# Patient Record
Sex: FEMALE | Race: WHITE | NOT HISPANIC OR LATINO | Employment: FULL TIME | URBAN - METROPOLITAN AREA
[De-identification: names, ages, dates, MRNs, and addresses within clinical notes are randomized per-mention and may not be internally consistent; named-entity substitution may affect disease eponyms.]

---

## 2025-01-16 ENCOUNTER — OFFICE VISIT (OUTPATIENT)
Dept: PRIMARY CARE | Facility: CLINIC | Age: 70
End: 2025-01-16
Payer: MEDICARE

## 2025-01-16 ENCOUNTER — TELEPHONE (OUTPATIENT)
Dept: PRIMARY CARE | Facility: CLINIC | Age: 70
End: 2025-01-16

## 2025-01-16 VITALS
HEIGHT: 66 IN | DIASTOLIC BLOOD PRESSURE: 84 MMHG | TEMPERATURE: 98.4 F | BODY MASS INDEX: 25.88 KG/M2 | HEART RATE: 110 BPM | WEIGHT: 161 LBS | SYSTOLIC BLOOD PRESSURE: 136 MMHG

## 2025-01-16 DIAGNOSIS — M79.604 RIGHT LEG PAIN: Primary | ICD-10-CM

## 2025-01-16 DIAGNOSIS — I82.431 ACUTE DEEP VEIN THROMBOSIS (DVT) OF POPLITEAL VEIN OF RIGHT LOWER EXTREMITY (MULTI): ICD-10-CM

## 2025-01-16 PROCEDURE — 99213 OFFICE O/P EST LOW 20 MIN: CPT | Performed by: FAMILY MEDICINE

## 2025-01-16 PROCEDURE — 1159F MED LIST DOCD IN RCRD: CPT | Performed by: FAMILY MEDICINE

## 2025-01-16 PROCEDURE — 1160F RVW MEDS BY RX/DR IN RCRD: CPT | Performed by: FAMILY MEDICINE

## 2025-01-16 PROCEDURE — 1036F TOBACCO NON-USER: CPT | Performed by: FAMILY MEDICINE

## 2025-01-16 PROCEDURE — 3008F BODY MASS INDEX DOCD: CPT | Performed by: FAMILY MEDICINE

## 2025-01-16 PROCEDURE — 1124F ACP DISCUSS-NO DSCNMKR DOCD: CPT | Performed by: FAMILY MEDICINE

## 2025-01-16 RX ORDER — APIXABAN 5 MG (74)
KIT ORAL
Qty: 74 TABLET | Refills: 0 | Status: CANCELLED | OUTPATIENT
Start: 2025-01-16

## 2025-01-16 ASSESSMENT — PATIENT HEALTH QUESTIONNAIRE - PHQ9
SUM OF ALL RESPONSES TO PHQ9 QUESTIONS 1 AND 2: 0
1. LITTLE INTEREST OR PLEASURE IN DOING THINGS: NOT AT ALL
2. FEELING DOWN, DEPRESSED OR HOPELESS: NOT AT ALL

## 2025-01-16 NOTE — PROGRESS NOTES
"Subjective   Patient ID: Emmie Krishna is a 69 y.o. female who presents for Leg Pain (Right leg pain ).    HPI   68 yo female presents co right lower leg pain since yesterday;  lower right medial calf;  she does have a varicose vein in this area with some swelling  She denies any redness or warmth.  No recent travel or prolonged immobilization.  Has not had any regular medications.  No hormones.  No trauma to the area.  Pain is worse with walking.  Does have a history of superficial blood clot years ago but no history of DVT or PE.  Denies any chest pain or shortness of breath.  Tried some heat and ibuprofen    Review of Systems  ROS as stated in HPI    Objective   /84   Pulse 110   Temp 36.9 °C (98.4 °F)   Ht 1.664 m (5' 5.5\")   Wt 73 kg (161 lb)   BMI 26.38 kg/m²     Physical Exam  Constitutional: A&O; NAD  Heart: RRR     Lungs: CTA bilateral   Ext: Right lower medial calf with area of tenderness over superficial varicosity.  No erythema or warmth.  Good pedal pulses.    Neuro: No gross neuro deficits     Psych: Judgment, orientation, mood, affect, insight wnl   Assessment/Plan   Problem List Items Addressed This Visit    None  Visit Diagnoses         Codes    Right leg pain    -  Primary M79.604    Relevant Orders    Vascular US lower extremity venous duplex right        Check stat duplex ultrasound to rule out DVT.  Fu with pcp for MWV     "

## 2025-01-16 NOTE — TELEPHONE ENCOUNTER
Patient sent for STAT duplex of right leg.  Showed phlebitis and acute free-floating DVT in her popliteal vein.  Patient sent to [facility].

## 2025-01-20 ENCOUNTER — APPOINTMENT (OUTPATIENT)
Dept: PRIMARY CARE | Facility: CLINIC | Age: 70
End: 2025-01-20
Payer: MEDICARE

## 2025-01-20 VITALS
SYSTOLIC BLOOD PRESSURE: 130 MMHG | BODY MASS INDEX: 25.55 KG/M2 | HEART RATE: 96 BPM | TEMPERATURE: 96.8 F | WEIGHT: 159 LBS | DIASTOLIC BLOOD PRESSURE: 80 MMHG | OXYGEN SATURATION: 97 % | HEIGHT: 66 IN

## 2025-01-20 DIAGNOSIS — I82.431 ACUTE DEEP VEIN THROMBOSIS (DVT) OF POPLITEAL VEIN OF RIGHT LOWER EXTREMITY (MULTI): Primary | ICD-10-CM

## 2025-01-20 DIAGNOSIS — Z12.11 COLON CANCER SCREENING: ICD-10-CM

## 2025-01-20 PROCEDURE — 1036F TOBACCO NON-USER: CPT | Performed by: FAMILY MEDICINE

## 2025-01-20 PROCEDURE — 1159F MED LIST DOCD IN RCRD: CPT | Performed by: FAMILY MEDICINE

## 2025-01-20 PROCEDURE — 99213 OFFICE O/P EST LOW 20 MIN: CPT | Performed by: FAMILY MEDICINE

## 2025-01-20 PROCEDURE — 1160F RVW MEDS BY RX/DR IN RCRD: CPT | Performed by: FAMILY MEDICINE

## 2025-01-20 PROCEDURE — 3008F BODY MASS INDEX DOCD: CPT | Performed by: FAMILY MEDICINE

## 2025-01-20 PROCEDURE — 1123F ACP DISCUSS/DSCN MKR DOCD: CPT | Performed by: FAMILY MEDICINE

## 2025-01-20 PROCEDURE — 1158F ADVNC CARE PLAN TLK DOCD: CPT | Performed by: FAMILY MEDICINE

## 2025-01-20 RX ORDER — APIXABAN 5 MG (74)
KIT ORAL
COMMUNITY
Start: 2025-01-16 | End: 2025-02-15

## 2025-01-20 ASSESSMENT — COLUMBIA-SUICIDE SEVERITY RATING SCALE - C-SSRS
2. HAVE YOU ACTUALLY HAD ANY THOUGHTS OF KILLING YOURSELF?: NO
1. IN THE PAST MONTH, HAVE YOU WISHED YOU WERE DEAD OR WISHED YOU COULD GO TO SLEEP AND NOT WAKE UP?: NO
6. HAVE YOU EVER DONE ANYTHING, STARTED TO DO ANYTHING, OR PREPARED TO DO ANYTHING TO END YOUR LIFE?: NO

## 2025-01-20 ASSESSMENT — PATIENT HEALTH QUESTIONNAIRE - PHQ9
1. LITTLE INTEREST OR PLEASURE IN DOING THINGS: NOT AT ALL
2. FEELING DOWN, DEPRESSED OR HOPELESS: NOT AT ALL
SUM OF ALL RESPONSES TO PHQ9 QUESTIONS 1 AND 2: 0

## 2025-01-20 NOTE — PROGRESS NOTES
"Subjective   Patient ID: Emmie Krishna is a 69 y.o. female who presents for Hospital Follow-up (Pt is here for hospital F/U Blood clot behind right knee ).  HPI    Review of Systems    Objective    /80   Pulse 96   Temp 36 °C (96.8 °F)   Ht 1.664 m (5' 5.5\")   Wt 72.1 kg (159 lb)   SpO2 97%   BMI 26.06 kg/m²    Physical Exam    Assessment/Plan   Problem List Items Addressed This Visit    None  Visit Diagnoses       Acute deep vein thrombosis (DVT) of popliteal vein of right lower extremity (Multi)    -  Primary    Colon cancer screening        Relevant Orders    Cologuard® colon cancer screening                 Na Arriaga MD  " distension, thyromegaly, or carotid bruits. Carotid upstrokes are brisk bilaterally. Lungs are clear to auscultation and percussion. Cardiac exam reveals the PMI to be normally sized and situated. Rhythm is regular. First and second heart sounds normal. No murmurs, rubs or gallops. Abdominal exam reveals normal bowel sounds, no masses, no organomegaly and no aortic enlargement. Extremities are nonedematous and both femoral and pedal pulses are normal.  Neurologic exam DTRs are equal bilaterally no focal deficits strength is symmetrical heme lymph no palpable lymph nodes in the neck axilla or groin    Assessment/Plan   Problem List Items Addressed This Visit       Acute deep vein thrombosis (DVT) of popliteal vein of right lower extremity (Multi) - Primary     Other Visit Diagnoses       Colon cancer screening        Relevant Orders    Cologuard® colon cancer screening                 Na Arriaga MD

## 2025-02-01 PROBLEM — I82.431 ACUTE DEEP VEIN THROMBOSIS (DVT) OF POPLITEAL VEIN OF RIGHT LOWER EXTREMITY (MULTI): Status: ACTIVE | Noted: 2025-02-01

## 2025-02-17 ENCOUNTER — TELEPHONE (OUTPATIENT)
Dept: PRIMARY CARE | Facility: CLINIC | Age: 70
End: 2025-02-17
Payer: MEDICARE

## 2025-02-17 NOTE — TELEPHONE ENCOUNTER
Pt was in [facility] on Weds night, stayed overnight because her heart rate went up to 197, they brought it down. She is on a beta blocker now. She has an appt with Dr Wasserman on Weds for a heart ablation. If needed she will schedule with Dr Arriaga for follow up after Weds appt. Please call pt [phone] if needed.

## 2025-02-26 ENCOUNTER — OFFICE VISIT (OUTPATIENT)
Dept: PRIMARY CARE | Facility: CLINIC | Age: 70
End: 2025-02-26
Payer: MEDICARE

## 2025-02-26 VITALS
TEMPERATURE: 96.8 F | SYSTOLIC BLOOD PRESSURE: 135 MMHG | HEIGHT: 66 IN | BODY MASS INDEX: 25.68 KG/M2 | RESPIRATION RATE: 18 BRPM | DIASTOLIC BLOOD PRESSURE: 84 MMHG | OXYGEN SATURATION: 97 % | HEART RATE: 80 BPM | WEIGHT: 159.8 LBS

## 2025-02-26 DIAGNOSIS — R00.0 TACHYCARDIA: Primary | ICD-10-CM

## 2025-02-26 PROCEDURE — 1160F RVW MEDS BY RX/DR IN RCRD: CPT | Performed by: FAMILY MEDICINE

## 2025-02-26 PROCEDURE — 1036F TOBACCO NON-USER: CPT | Performed by: FAMILY MEDICINE

## 2025-02-26 PROCEDURE — 1158F ADVNC CARE PLAN TLK DOCD: CPT | Performed by: FAMILY MEDICINE

## 2025-02-26 PROCEDURE — 1123F ACP DISCUSS/DSCN MKR DOCD: CPT | Performed by: FAMILY MEDICINE

## 2025-02-26 PROCEDURE — 1159F MED LIST DOCD IN RCRD: CPT | Performed by: FAMILY MEDICINE

## 2025-02-26 PROCEDURE — 3008F BODY MASS INDEX DOCD: CPT | Performed by: FAMILY MEDICINE

## 2025-02-26 PROCEDURE — 99214 OFFICE O/P EST MOD 30 MIN: CPT | Performed by: FAMILY MEDICINE

## 2025-02-26 RX ORDER — METOPROLOL SUCCINATE 25 MG/1
25 TABLET, EXTENDED RELEASE ORAL DAILY
COMMUNITY

## 2025-02-26 ASSESSMENT — PATIENT HEALTH QUESTIONNAIRE - PHQ9
2. FEELING DOWN, DEPRESSED OR HOPELESS: NOT AT ALL
1. LITTLE INTEREST OR PLEASURE IN DOING THINGS: NOT AT ALL
SUM OF ALL RESPONSES TO PHQ9 QUESTIONS 1 AND 2: 0

## 2025-02-26 NOTE — PROGRESS NOTES
"Subjective   Patient ID: Emmie Krishna is a 70 y.o. female who presents for Follow-up (Hospitalized overnight [facility] 2/12/25 for SVT / Needs to discuss ablation procedure Has appointment 03/05/25 to see Cardiology / Physiologist to schedule procedure.).  HPI    Review of Systems  Constitutional: no chills, no fever and no night sweats.   Eyes: no blurred vision and no eyesight problems.   ENT: no hearing loss, no nasal congestion, no nasal discharge, no hoarseness and no sore throat.   Cardiovascular: no chest pain, no intermittent leg claudication, no lower extremity edema, no palpitations and no syncope.   Respiratory: no cough, no shortness of breath during exertion, no shortness of breath at rest and no wheezing.   Gastrointestinal: no abdominal pain, no blood in stools, no constipation, no diarrhea, no melena, no nausea, no rectal pain and no vomiting.   Genitourinary: no dysuria, no change in urinary frequency, no urinary hesitancy, no feelings of urinary urgency and no vaginal discharge.   Musculoskeletal: no arthralgias,  no back pain and no myalgias.   Integumentary: no new skin lesions and no rashes.   Neurological: no difficulty walking, no headache, no limb weakness, no numbness and no tingling.   Psychiatric: no anxiety, no depression, no anhedonia and no substance use disorders.   Endocrine: no recent weight gain and no recent weight loss.   Hematologic/Lymphatic: no tendency for easy bruising and no swollen glands .    Objective    /84 (BP Location: Right arm, Patient Position: Sitting)   Pulse 80   Temp 36 °C (96.8 °F) (Temporal)   Resp 18   Ht 1.664 m (5' 5.5\")   Wt 72.5 kg (159 lb 12.8 oz)   SpO2 97%   BMI 26.19 kg/m²    Physical Exam  The patient appeared well nourished and normally developed. Vital signs as documented. Head exam is unremarkable. No scleral icterus or corneal arcus noted.  Pupils are equal round reactive to light extraocular movements are intact no hemorrhages noted " on funduscopic exam mouth mucous membranes are moist no exudates ears canals clear TMs are gray pearly not injected nose no rhinorrhea or epistaxis Neck is without jugular venous distension, thyromegaly, or carotid bruits. Carotid upstrokes are brisk bilaterally. Lungs are clear to auscultation and percussion. Cardiac exam reveals the PMI to be normally sized and situated. Rhythm is regular. First and second heart sounds normal. No murmurs, rubs or gallops. Abdominal exam reveals normal bowel sounds, no masses, no organomegaly and no aortic enlargement. Extremities are nonedematous and both femoral and pedal pulses are normal.  Neurologic exam DTRs are equal bilaterally no focal deficits strength is symmetrical heme lymph no palpable lymph nodes in the neck axilla or groin    Assessment/Plan   Problem List Items Addressed This Visit       Tachycardia - Primary            Na Arriaga MD

## 2025-03-01 PROBLEM — R00.0 TACHYCARDIA: Status: ACTIVE | Noted: 2025-03-01

## 2025-03-04 ENCOUNTER — TELEPHONE (OUTPATIENT)
Dept: CARDIOLOGY | Facility: CLINIC | Age: 70
End: 2025-03-04
Payer: MEDICARE

## 2025-03-04 NOTE — TELEPHONE ENCOUNTER
Patient is scheduled to see Dr. Ramicone on 3/5/25.  Patient is requesting a letter stating that she will be unable to fly due to an ablation.  States that she is scheduled to fly to [place] on 3/10/25.

## 2025-03-05 ENCOUNTER — OFFICE VISIT (OUTPATIENT)
Dept: CARDIOLOGY | Facility: CLINIC | Age: 70
End: 2025-03-05
Payer: MEDICARE

## 2025-03-05 VITALS
DIASTOLIC BLOOD PRESSURE: 83 MMHG | WEIGHT: 159 LBS | SYSTOLIC BLOOD PRESSURE: 144 MMHG | HEART RATE: 79 BPM | OXYGEN SATURATION: 98 % | BODY MASS INDEX: 26.49 KG/M2 | HEIGHT: 65 IN

## 2025-03-05 DIAGNOSIS — I47.10 PSVT (PAROXYSMAL SUPRAVENTRICULAR TACHYCARDIA) (CMS-HCC): Primary | ICD-10-CM

## 2025-03-05 DIAGNOSIS — I82.431 ACUTE DEEP VEIN THROMBOSIS (DVT) OF POPLITEAL VEIN OF RIGHT LOWER EXTREMITY (MULTI): ICD-10-CM

## 2025-03-05 PROBLEM — R55 SYNCOPE: Status: ACTIVE | Noted: 2025-02-12

## 2025-03-05 PROBLEM — I82.409 DEEP VEIN THROMBOSIS (DVT) OF LOWER EXTREMITY (MULTI): Status: ACTIVE | Noted: 2025-01-16

## 2025-03-05 PROBLEM — I83.219: Status: ACTIVE | Noted: 2023-01-27

## 2025-03-05 PROBLEM — R42 LIGHTHEADEDNESS: Status: ACTIVE | Noted: 2025-02-06

## 2025-03-05 PROBLEM — L97.929: Status: ACTIVE | Noted: 2023-01-27

## 2025-03-05 PROBLEM — I83.229: Status: ACTIVE | Noted: 2023-01-27

## 2025-03-05 PROBLEM — L97.919: Status: ACTIVE | Noted: 2023-01-27

## 2025-03-05 PROCEDURE — 99215 OFFICE O/P EST HI 40 MIN: CPT | Performed by: INTERNAL MEDICINE

## 2025-03-05 PROCEDURE — 99205 OFFICE O/P NEW HI 60 MIN: CPT | Performed by: INTERNAL MEDICINE

## 2025-03-05 PROCEDURE — 93005 ELECTROCARDIOGRAM TRACING: CPT | Performed by: INTERNAL MEDICINE

## 2025-03-05 PROCEDURE — 1159F MED LIST DOCD IN RCRD: CPT | Performed by: INTERNAL MEDICINE

## 2025-03-05 PROCEDURE — 1123F ACP DISCUSS/DSCN MKR DOCD: CPT | Performed by: INTERNAL MEDICINE

## 2025-03-05 PROCEDURE — 1036F TOBACCO NON-USER: CPT | Performed by: INTERNAL MEDICINE

## 2025-03-05 PROCEDURE — 3008F BODY MASS INDEX DOCD: CPT | Performed by: INTERNAL MEDICINE

## 2025-03-05 RX ORDER — METOPROLOL SUCCINATE 25 MG/1
25 TABLET, EXTENDED RELEASE ORAL DAILY
Qty: 30 TABLET | Refills: 11 | Status: SHIPPED | OUTPATIENT
Start: 2025-03-05 | End: 2026-03-05

## 2025-03-05 RX ORDER — APIXABAN 5 MG/1
1 TABLET, FILM COATED ORAL
COMMUNITY
Start: 2025-02-10

## 2025-03-05 NOTE — ASSESSMENT & PLAN NOTE
The patient is taking Eliquis 5 mg twice daily for a right popliteal DVT.  She is going to follow-up with Dr. Altaf Sutherland this month and then determine the duration of therapy needed for the DVT.  We will plan to temporarily interrupt the anticoagulation for the ablation procedure, and heparin will be utilized during the ablation to minimize risk of DVT formation.

## 2025-03-05 NOTE — PROGRESS NOTES
Reason For Consult:    Supraventricular tachycardia    History Of Present Illness:    This is a 70-year-old female with supraventricular tachycardia.  She presents today for an electrophysiology consultation.  The patient first began experiencing episodes of supraventricular tachycardia in the summer 2024.  1 episode occurred while she was playing pickle ball.  She sat down to take a rest and the arrhythmia stopped spontaneously.  She had a few other episodes that would occur randomly and would also terminate spontaneously.  More recently she had episodes on January 6, 2025 and then an episode on February 12, 2025 which required intravenous adenosine for termination.  The patient was seen at [facility].  She has been been taking metoprolol succinate 25 mg daily.    Past medical history:    Popliteal DVT of the right lower extremity: This occurred following a long plane flight to [place].  She has been on Eliquis 5 mg twice daily since January 16, 2025.    Past surgical history: Foot surgery    Social history: Non-smoker    Family history: Negative for premature CAD    Review of systems:  Other review of systems negative other than as outlined in HPI    Social History:  She reports that she has never smoked. She has never used smokeless tobacco. She reports that she does not currently use alcohol. She reports that she does not use drugs.    Family History:  No family history on file.    Allergies:  Prednisone and Thimerosal    Medications:  Current Outpatient Medications   Medication Instructions    Eliquis DVT-PE Treat 30D Start 5 mg (74 tabs) tablet 1 packets, ORAL, ONCE, as directed on package labeling;, 30 days, 1 EA, Date: 1/16/25 4:22:00 PM EST    metoprolol succinate XL (TOPROL-XL) 25 mg, oral, Daily, Do not crush or chew.       Vitals:      10/25/2022    11:54 AM 1/16/2025    12:50 PM 1/20/2025     1:07 PM 1/20/2025     1:22 PM 2/26/2025     1:02 PM   Vitals   Systolic 124 136 155  "130 135   Diastolic 68 84 80 80 84   BP Location     Right arm   Heart Rate 67 110 96  80   Temp 36.2 °C (97.2 °F) 36.9 °C (98.4 °F) 36 °C (96.8 °F)  36 °C (96.8 °F)   Resp     18   Height 1.664 m (5' 5.5\") 1.664 m (5' 5.5\") 1.664 m (5' 5.5\")  1.664 m (5' 5.5\")   Weight (lb) 158 161 159  159.8   BMI 25.89 kg/m2 26.38 kg/m2 26.06 kg/m2  26.19 kg/m2   BSA (m2) 1.82 m2 1.84 m2 1.83 m2  1.83 m2   Visit Report  Report Report Report Report       Physical Exam:    General Appearance:  Alert, oriented, no distress  Skin:  Warm and dry  Head and Neck:  No elevation of JVP, no carotid bruits  Cardiac Exam:  Rhythm is regular, S1 and S2 are normal, no murmur S3 or S4  Lungs:  Clear to auscultation  Extremities:  no edema  Neurologic:  No focal deficits  Psychiatric:  Appropriate mood and behavior    Lab Results:     CMP:No results for input(s): \"NA\", \"K\", \"CL\", \"CO2\", \"ANIONGAP\", \"BUN\", \"CREATININE\", \"EGFR\", \"MG\" in the last 04897 hours.No results for input(s): \"ALBUMIN\", \"ALKPHOS\", \"ALT\", \"AST\", \"BILITOT\", \"LIPASE\" in the last 88161 hours.    No lab exists for component: \"CA\"  CBC:No results for input(s): \"WBC\", \"HGB\", \"HCT\", \"PLT\", \"MCV\" in the last 79516 hours.  COAG: No results for input(s): \"PTT\", \"INR\", \"HAUF\", \"DDIMERVTE\", \"HAPTOGLOBIN\", \"FIBRINOGEN\" in the last 85650 hours.  HEME/ENDO:No results for input(s): \"FERRITIN\", \"IRONSAT\", \"TSH\", \"HGBA1C\" in the last 63171 hours.   CARDIAC: No results for input(s): \"LDH\", \"CKMB\", \"TROPHS\", \"BNP\" in the last 35930 hours.    No lab exists for component: \"CK\", \"CKMBP\"No results for input(s): \"CHOL\", \"LDLF\", \"HDL\", \"TRIG\" in the last 60430 hours.    Test Results (personally reviewed):    EKG March 5, 2025: Sinus rhythm, no ventricular preexcitation    SVT is a regular narrow QRS complex tachycardia, 175 bpm    Assessment/Plan  Problem List Items Addressed This Visit             ICD-10-CM    Acute deep vein thrombosis (DVT) of popliteal vein of right lower extremity (Multi) I82.431 "     The patient is taking Eliquis 5 mg twice daily for a right popliteal DVT.  She is going to follow-up with Dr. Altaf Sutherland this month and then determine the duration of therapy needed for the DVT.  We will plan to temporarily interrupt the anticoagulation for the ablation procedure, and heparin will be utilized during the ablation to minimize risk of DVT formation.         PSVT (paroxysmal supraventricular tachycardia) (CMS-[facility]) - Primary I47.10     Emmie has a history of SVT which began during the summer 2024.  Most of the episodes terminated spontaneously.  The arrhythmia would typically begin with an abrupt onset and then eventually terminate.  However, in February 2025 the arrhythmia did not terminate and she was given 6 mg of intravenous adenosine which restored sinus rhythm.  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  Catheter ablation is recommended.  The ablation procedure and risks were discussed in detail today with the patient and her family.  She is in agreement with the recommendation and this will be scheduled at [facility].         Relevant Medications    metoprolol succinate XL (Toprol-XL) 25 mg 24 hr tablet    Other Relevant Orders    ECG 12 lead (Clinic Performed)     James C Ramicone, DO

## 2025-03-05 NOTE — PATIENT INSTRUCTIONS
You will be scheduled for a catheter ablation at [facility].    Hold Metoprolol 48 hours before ablation.

## 2025-03-05 NOTE — H&P (VIEW-ONLY)
Reason For Consult:    Supraventricular tachycardia    History Of Present Illness:    This is a 70-year-old female with supraventricular tachycardia.  She presents today for an electrophysiology consultation.  The patient first began experiencing episodes of supraventricular tachycardia in the summer 2024.  1 episode occurred while she was playing pickle ball.  She sat down to take a rest and the arrhythmia stopped spontaneously.  She had a few other episodes that would occur randomly and would also terminate spontaneously.  More recently she had episodes on January 6, 2025 and then an episode on February 12, 2025 which required intravenous adenosine for termination.  The patient was seen at [facility].  She has been been taking metoprolol succinate 25 mg daily.    Past medical history:    Popliteal DVT of the right lower extremity: This occurred following a long plane flight to [place].  She has been on Eliquis 5 mg twice daily since January 16, 2025.    Past surgical history: Foot surgery    Social history: Non-smoker    Family history: Negative for premature CAD    Review of systems:  Other review of systems negative other than as outlined in HPI    Social History:  She reports that she has never smoked. She has never used smokeless tobacco. She reports that she does not currently use alcohol. She reports that she does not use drugs.    Family History:  No family history on file.    Allergies:  Prednisone and Thimerosal    Medications:  Current Outpatient Medications   Medication Instructions    Eliquis DVT-PE Treat 30D Start 5 mg (74 tabs) tablet 1 packets, ORAL, ONCE, as directed on package labeling;, 30 days, 1 EA, Date: 1/16/25 4:22:00 PM EST    metoprolol succinate XL (TOPROL-XL) 25 mg, oral, Daily, Do not crush or chew.       Vitals:      10/25/2022    11:54 AM 1/16/2025    12:50 PM 1/20/2025     1:07 PM 1/20/2025     1:22 PM 2/26/2025     1:02 PM   Vitals   Systolic 124 136 155  "130 135   Diastolic 68 84 80 80 84   BP Location     Right arm   Heart Rate 67 110 96  80   Temp 36.2 °C (97.2 °F) 36.9 °C (98.4 °F) 36 °C (96.8 °F)  36 °C (96.8 °F)   Resp     18   Height 1.664 m (5' 5.5\") 1.664 m (5' 5.5\") 1.664 m (5' 5.5\")  1.664 m (5' 5.5\")   Weight (lb) 158 161 159  159.8   BMI 25.89 kg/m2 26.38 kg/m2 26.06 kg/m2  26.19 kg/m2   BSA (m2) 1.82 m2 1.84 m2 1.83 m2  1.83 m2   Visit Report  Report Report Report Report       Physical Exam:    General Appearance:  Alert, oriented, no distress  Skin:  Warm and dry  Head and Neck:  No elevation of JVP, no carotid bruits  Cardiac Exam:  Rhythm is regular, S1 and S2 are normal, no murmur S3 or S4  Lungs:  Clear to auscultation  Extremities:  no edema  Neurologic:  No focal deficits  Psychiatric:  Appropriate mood and behavior    Lab Results:     CMP:No results for input(s): \"NA\", \"K\", \"CL\", \"CO2\", \"ANIONGAP\", \"BUN\", \"CREATININE\", \"EGFR\", \"MG\" in the last 21905 hours.No results for input(s): \"ALBUMIN\", \"ALKPHOS\", \"ALT\", \"AST\", \"BILITOT\", \"LIPASE\" in the last 53518 hours.    No lab exists for component: \"CA\"  CBC:No results for input(s): \"WBC\", \"HGB\", \"HCT\", \"PLT\", \"MCV\" in the last 17238 hours.  COAG: No results for input(s): \"PTT\", \"INR\", \"HAUF\", \"DDIMERVTE\", \"HAPTOGLOBIN\", \"FIBRINOGEN\" in the last 41616 hours.  HEME/ENDO:No results for input(s): \"FERRITIN\", \"IRONSAT\", \"TSH\", \"HGBA1C\" in the last 43349 hours.   CARDIAC: No results for input(s): \"LDH\", \"CKMB\", \"TROPHS\", \"BNP\" in the last 56670 hours.    No lab exists for component: \"CK\", \"CKMBP\"No results for input(s): \"CHOL\", \"LDLF\", \"HDL\", \"TRIG\" in the last 31356 hours.    Test Results (personally reviewed):    EKG March 5, 2025: Sinus rhythm, no ventricular preexcitation    SVT is a regular narrow QRS complex tachycardia, 175 bpm    Assessment/Plan  Problem List Items Addressed This Visit             ICD-10-CM    Acute deep vein thrombosis (DVT) of popliteal vein of right lower extremity (Multi) I82.431 "     The patient is taking Eliquis 5 mg twice daily for a right popliteal DVT.  She is going to follow-up with Dr. Altaf Sutherland this month and then determine the duration of therapy needed for the DVT.  We will plan to temporarily interrupt the anticoagulation for the ablation procedure, and heparin will be utilized during the ablation to minimize risk of DVT formation.         PSVT (paroxysmal supraventricular tachycardia) (CMS-[facility]) - Primary I47.10     Emmie has a history of SVT which began during the summer 2024.  Most of the episodes terminated spontaneously.  The arrhythmia would typically begin with an abrupt onset and then eventually terminate.  However, in February 2025 the arrhythmia did not terminate and she was given 6 mg of intravenous adenosine which restored sinus rhythm.  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  Catheter ablation is recommended.  The ablation procedure and risks were discussed in detail today with the patient and her family.  She is in agreement with the recommendation and this will be scheduled at [facility].         Relevant Medications    metoprolol succinate XL (Toprol-XL) 25 mg 24 hr tablet    Other Relevant Orders    ECG 12 lead (Clinic Performed)     James C Ramicone, DO

## 2025-03-05 NOTE — LETTER
March 5, 2025     Na Arriaga MD  [address] [address]  [address]  [place] [address]    Patient: Emmie Krishna   YOB: 1955   Date of Visit: 3/5/2025       Dear Na    I saw Emmie Krishna today for evaluation of SVT. Below are my notes for this consultation.  If you have questions, please do not hesitate to call me. I look forward to following your patient along with you.       Sincerely,     James C Ramicone, DO      CC: No Recipients  ______________________________________________________________________________________          Reason For Consult:    Supraventricular tachycardia    History Of Present Illness:    This is a 70-year-old female with supraventricular tachycardia.  She presents today for an electrophysiology consultation.  The patient first began experiencing episodes of supraventricular tachycardia in the summer 2024.  1 episode occurred while she was playing pickle ball.  She sat down to take a rest and the arrhythmia stopped spontaneously.  She had a few other episodes that would occur randomly and would also terminate spontaneously.  More recently she had episodes on January 6, 2025 and then an episode on February 12, 2025 which required intravenous adenosine for termination.  The patient was seen at [facility].  She has been been taking metoprolol succinate 25 mg daily.    Past medical history:    Popliteal DVT of the right lower extremity: This occurred following a long plane flight to [place].  She has been on Eliquis 5 mg twice daily since January 16, 2025.    Past surgical history: Foot surgery    Social history: Non-smoker    Family history: Negative for premature CAD    Review of systems:  Other review of systems negative other than as outlined in HPI    Social History:  She reports that she has never smoked. She has never used smokeless tobacco. She reports that she does not currently use alcohol. She reports that she does not use drugs.    Family  "History:  No family history on file.    Allergies:  Prednisone and Thimerosal    Medications:  Current Outpatient Medications   Medication Instructions   • Eliquis DVT-PE Treat 30D Start 5 mg (74 tabs) tablet 1 packets, ORAL, ONCE, as directed on package labeling;, 30 days, 1 EA, Date: 1/16/25 4:22:00 PM EST   • metoprolol succinate XL (TOPROL-XL) 25 mg, oral, Daily, Do not crush or chew.       Vitals:      10/25/2022    11:54 AM 1/16/2025    12:50 PM 1/20/2025     1:07 PM 1/20/2025     1:22 PM 2/26/2025     1:02 PM   Vitals   Systolic 124 136 155 130 135   Diastolic 68 84 80 80 84   BP Location     Right arm   Heart Rate 67 110 96  80   Temp 36.2 °C (97.2 °F) 36.9 °C (98.4 °F) 36 °C (96.8 °F)  36 °C (96.8 °F)   Resp     18   Height 1.664 m (5' 5.5\") 1.664 m (5' 5.5\") 1.664 m (5' 5.5\")  1.664 m (5' 5.5\")   Weight (lb) 158 161 159  159.8   BMI 25.89 kg/m2 26.38 kg/m2 26.06 kg/m2  26.19 kg/m2   BSA (m2) 1.82 m2 1.84 m2 1.83 m2  1.83 m2   Visit Report  Report Report Report Report       Physical Exam:    General Appearance:  Alert, oriented, no distress  Skin:  Warm and dry  Head and Neck:  No elevation of JVP, no carotid bruits  Cardiac Exam:  Rhythm is regular, S1 and S2 are normal, no murmur S3 or S4  Lungs:  Clear to auscultation  Extremities:  no edema  Neurologic:  No focal deficits  Psychiatric:  Appropriate mood and behavior    Lab Results:     CMP:No results for input(s): \"NA\", \"K\", \"CL\", \"CO2\", \"ANIONGAP\", \"BUN\", \"CREATININE\", \"EGFR\", \"MG\" in the last 96909 hours.No results for input(s): \"ALBUMIN\", \"ALKPHOS\", \"ALT\", \"AST\", \"BILITOT\", \"LIPASE\" in the last 94168 hours.    No lab exists for component: \"CA\"  CBC:No results for input(s): \"WBC\", \"HGB\", \"HCT\", \"PLT\", \"MCV\" in the last 34182 hours.  COAG: No results for input(s): \"PTT\", \"INR\", \"HAUF\", \"DDIMERVTE\", \"HAPTOGLOBIN\", \"FIBRINOGEN\" in the last 95218 hours.  HEME/ENDO:No results for input(s): \"FERRITIN\", \"IRONSAT\", \"TSH\", \"HGBA1C\" in the last 65849 hours. " "  CARDIAC: No results for input(s): \"LDH\", \"CKMB\", \"TROPHS\", \"BNP\" in the last 74889 hours.    No lab exists for component: \"CK\", \"CKMBP\"No results for input(s): \"CHOL\", \"LDLF\", \"HDL\", \"TRIG\" in the last 48427 hours.    Test Results (personally reviewed):    EKG March 5, 2025: Sinus rhythm, no ventricular preexcitation    SVT is a regular narrow QRS complex tachycardia, 175 bpm    Assessment/Plan  Problem List Items Addressed This Visit             ICD-10-CM    Acute deep vein thrombosis (DVT) of popliteal vein of right lower extremity (Multi) I82.431     The patient is taking Eliquis 5 mg twice daily for a right popliteal DVT.  She is going to follow-up with Dr. Altaf Sutherland this month and then determine the duration of therapy needed for the DVT.  We will plan to temporarily interrupt the anticoagulation for the ablation procedure, and heparin will be utilized during the ablation to minimize risk of DVT formation.         PSVT (paroxysmal supraventricular tachycardia) (CMS-[facility]) - Primary I47.10     Emmie has a history of SVT which began during the summer 2024.  Most of the episodes terminated spontaneously.  The arrhythmia would typically begin with an abrupt onset and then eventually terminate.  However, in February 2025 the arrhythmia did not terminate and she was given 6 mg of intravenous adenosine which restored sinus rhythm.  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  Catheter ablation is recommended.  The ablation procedure and risks were discussed in detail today with the patient and her family.  She is in agreement with the recommendation and this will be scheduled at [facility].         Relevant Medications    metoprolol succinate XL (Toprol-XL) 25 mg 24 hr tablet    Other Relevant Orders    ECG 12 lead (Clinic Performed)     James C Ramicone, DO    "

## 2025-03-05 NOTE — ASSESSMENT & PLAN NOTE
Emmie has a history of SVT which began during the summer 2024.  Most of the episodes terminated spontaneously.  The arrhythmia would typically begin with an abrupt onset and then eventually terminate.  However, in February 2025 the arrhythmia did not terminate and she was given 6 mg of intravenous adenosine which restored sinus rhythm.  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  Catheter ablation is recommended.  The ablation procedure and risks were discussed in detail today with the patient and her family.  She is in agreement with the recommendation and this will be scheduled at [facility].

## 2025-03-06 ENCOUNTER — TELEPHONE (OUTPATIENT)
Dept: PRIMARY CARE | Facility: CLINIC | Age: 70
End: 2025-03-06
Payer: MEDICARE

## 2025-03-06 LAB
ATRIAL RATE: 78 BPM
P AXIS: 61 DEGREES
P OFFSET: 189 MS
P ONSET: 148 MS
PR INTERVAL: 152 MS
Q ONSET: 224 MS
QRS COUNT: 13 BEATS
QRS DURATION: 70 MS
QT INTERVAL: 374 MS
QTC CALCULATION(BAZETT): 426 MS
QTC FREDERICIA: 408 MS
R AXIS: 78 DEGREES
T AXIS: 76 DEGREES
T OFFSET: 411 MS
VENTRICULAR RATE: 78 BPM

## 2025-03-06 NOTE — TELEPHONE ENCOUNTER
Pt is needing a letter written to the airport so she can get a refund on her flight stating that she cannot fly due to an upcoming Catheter ablation procedure

## 2025-03-07 ENCOUNTER — TELEPHONE (OUTPATIENT)
Dept: CARDIOLOGY | Facility: CLINIC | Age: 70
End: 2025-03-07
Payer: MEDICARE

## 2025-03-07 DIAGNOSIS — I47.10 PSVT (PAROXYSMAL SUPRAVENTRICULAR TACHYCARDIA) (CMS-HCC): ICD-10-CM

## 2025-03-07 NOTE — TELEPHONE ENCOUNTER
Called and left message for patient to call me back. I would like to schedule her ablation with Dr. Ramicone

## 2025-03-07 NOTE — TELEPHONE ENCOUNTER
Patient will take 3/25/25 Ablation, please call her back today to review this, and give her the time.  Will be nita. To talk today after 3:30pm

## 2025-03-24 LAB — NONINV COLON CA DNA+OCC BLD SCRN STL QL: NEGATIVE

## 2025-03-25 ENCOUNTER — ANESTHESIA (OUTPATIENT)
Dept: CARDIOLOGY | Facility: HOSPITAL | Age: 70
End: 2025-03-25
Payer: MEDICARE

## 2025-03-25 ENCOUNTER — HOSPITAL ENCOUNTER (OUTPATIENT)
Dept: CARDIOLOGY | Facility: HOSPITAL | Age: 70
Discharge: HOME | End: 2025-03-25
Payer: MEDICARE

## 2025-03-25 ENCOUNTER — HOSPITAL ENCOUNTER (OUTPATIENT)
Facility: HOSPITAL | Age: 70
Setting detail: OUTPATIENT SURGERY
Discharge: HOME | End: 2025-03-25
Attending: INTERNAL MEDICINE | Admitting: INTERNAL MEDICINE
Payer: MEDICARE

## 2025-03-25 ENCOUNTER — TELEPHONE (OUTPATIENT)
Dept: PRIMARY CARE | Facility: CLINIC | Age: 70
End: 2025-03-25

## 2025-03-25 ENCOUNTER — ANESTHESIA EVENT (OUTPATIENT)
Dept: CARDIOLOGY | Facility: HOSPITAL | Age: 70
End: 2025-03-25
Payer: MEDICARE

## 2025-03-25 VITALS
SYSTOLIC BLOOD PRESSURE: 127 MMHG | RESPIRATION RATE: 15 BRPM | TEMPERATURE: 97 F | WEIGHT: 159.17 LBS | HEIGHT: 65 IN | BODY MASS INDEX: 26.52 KG/M2 | HEART RATE: 80 BPM | OXYGEN SATURATION: 99 % | DIASTOLIC BLOOD PRESSURE: 66 MMHG

## 2025-03-25 DIAGNOSIS — I47.10 PSVT (PAROXYSMAL SUPRAVENTRICULAR TACHYCARDIA) (CMS-HCC): ICD-10-CM

## 2025-03-25 LAB
ABO GROUP (TYPE) IN BLOOD: NORMAL
ABO GROUP (TYPE) IN BLOOD: NORMAL
ANION GAP SERPL CALC-SCNC: 12 MMOL/L (ref 10–20)
ANTIBODY SCREEN: NORMAL
BUN SERPL-MCNC: 17 MG/DL (ref 6–23)
CALCIUM SERPL-MCNC: 9.2 MG/DL (ref 8.6–10.3)
CHLORIDE SERPL-SCNC: 103 MMOL/L (ref 98–107)
CO2 SERPL-SCNC: 27 MMOL/L (ref 21–32)
CREAT SERPL-MCNC: 0.9 MG/DL (ref 0.5–1.05)
EGFRCR SERPLBLD CKD-EPI 2021: 69 ML/MIN/1.73M*2
ERYTHROCYTE [DISTWIDTH] IN BLOOD BY AUTOMATED COUNT: 12.3 % (ref 11.5–14.5)
GLUCOSE SERPL-MCNC: 238 MG/DL (ref 74–99)
HCT VFR BLD AUTO: 43.9 % (ref 36–46)
HGB BLD-MCNC: 14.3 G/DL (ref 12–16)
INR PPP: 1 (ref 0.9–1.1)
MCH RBC QN AUTO: 29.5 PG (ref 26–34)
MCHC RBC AUTO-ENTMCNC: 32.6 G/DL (ref 32–36)
MCV RBC AUTO: 91 FL (ref 80–100)
NRBC BLD-RTO: 0 /100 WBCS (ref 0–0)
PLATELET # BLD AUTO: 192 X10*3/UL (ref 150–450)
POTASSIUM SERPL-SCNC: 3.8 MMOL/L (ref 3.5–5.3)
PROTHROMBIN TIME: 10.8 SECONDS (ref 9.8–12.4)
RBC # BLD AUTO: 4.85 X10*6/UL (ref 4–5.2)
RH FACTOR (ANTIGEN D): NORMAL
RH FACTOR (ANTIGEN D): NORMAL
SODIUM SERPL-SCNC: 138 MMOL/L (ref 136–145)
WBC # BLD AUTO: 5.2 X10*3/UL (ref 4.4–11.3)

## 2025-03-25 PROCEDURE — 80048 BASIC METABOLIC PNL TOTAL CA: CPT | Performed by: INTERNAL MEDICINE

## 2025-03-25 PROCEDURE — 7100000002 HC RECOVERY ROOM TIME - EACH INCREMENTAL 1 MINUTE: Performed by: INTERNAL MEDICINE

## 2025-03-25 PROCEDURE — 93623 PRGRMD STIMJ&PACG IV RX NFS: CPT | Performed by: INTERNAL MEDICINE

## 2025-03-25 PROCEDURE — 86901 BLOOD TYPING SEROLOGIC RH(D): CPT | Performed by: INTERNAL MEDICINE

## 2025-03-25 PROCEDURE — 85027 COMPLETE CBC AUTOMATED: CPT | Performed by: INTERNAL MEDICINE

## 2025-03-25 PROCEDURE — 36415 COLL VENOUS BLD VENIPUNCTURE: CPT | Performed by: INTERNAL MEDICINE

## 2025-03-25 PROCEDURE — 3700000002 HC GENERAL ANESTHESIA TIME - EACH INCREMENTAL 1 MINUTE: Performed by: INTERNAL MEDICINE

## 2025-03-25 PROCEDURE — 2780000003 HC OR 278 NO HCPCS: Performed by: INTERNAL MEDICINE

## 2025-03-25 PROCEDURE — 93010 ELECTROCARDIOGRAM REPORT: CPT | Performed by: INTERNAL MEDICINE

## 2025-03-25 PROCEDURE — 85610 PROTHROMBIN TIME: CPT | Performed by: INTERNAL MEDICINE

## 2025-03-25 PROCEDURE — 3700000001 HC GENERAL ANESTHESIA TIME - INITIAL BASE CHARGE: Performed by: INTERNAL MEDICINE

## 2025-03-25 PROCEDURE — 7100000001 HC RECOVERY ROOM TIME - INITIAL BASE CHARGE: Performed by: INTERNAL MEDICINE

## 2025-03-25 PROCEDURE — 7100000009 HC PHASE TWO TIME - INITIAL BASE CHARGE: Performed by: INTERNAL MEDICINE

## 2025-03-25 PROCEDURE — 93005 ELECTROCARDIOGRAM TRACING: CPT | Mod: 59

## 2025-03-25 PROCEDURE — C1887 CATHETER, GUIDING: HCPCS | Performed by: INTERNAL MEDICINE

## 2025-03-25 PROCEDURE — 93653 COMPRE EP EVAL TX SVT: CPT | Performed by: INTERNAL MEDICINE

## 2025-03-25 PROCEDURE — 2500000004 HC RX 250 GENERAL PHARMACY W/ HCPCS (ALT 636 FOR OP/ED): Performed by: INTERNAL MEDICINE

## 2025-03-25 PROCEDURE — C1730 CATH, EP, 19 OR FEW ELECT: HCPCS | Performed by: INTERNAL MEDICINE

## 2025-03-25 PROCEDURE — 7100000010 HC PHASE TWO TIME - EACH INCREMENTAL 1 MINUTE: Performed by: INTERNAL MEDICINE

## 2025-03-25 PROCEDURE — 2500000004 HC RX 250 GENERAL PHARMACY W/ HCPCS (ALT 636 FOR OP/ED): Performed by: ANESTHESIOLOGIST ASSISTANT

## 2025-03-25 PROCEDURE — 2720000007 HC OR 272 NO HCPCS: Performed by: INTERNAL MEDICINE

## 2025-03-25 PROCEDURE — C1760 CLOSURE DEV, VASC: HCPCS | Performed by: INTERNAL MEDICINE

## 2025-03-25 PROCEDURE — G0269 OCCLUSIVE DEVICE IN VEIN ART: HCPCS | Performed by: INTERNAL MEDICINE

## 2025-03-25 PROCEDURE — 2500000005 HC RX 250 GENERAL PHARMACY W/O HCPCS: Performed by: ANESTHESIOLOGIST ASSISTANT

## 2025-03-25 PROCEDURE — C1894 INTRO/SHEATH, NON-LASER: HCPCS | Performed by: INTERNAL MEDICINE

## 2025-03-25 RX ORDER — METOCLOPRAMIDE HYDROCHLORIDE 5 MG/ML
10 INJECTION INTRAMUSCULAR; INTRAVENOUS ONCE AS NEEDED
Status: CANCELLED | OUTPATIENT
Start: 2025-03-25

## 2025-03-25 RX ORDER — DIPHENHYDRAMINE HYDROCHLORIDE 50 MG/ML
25 INJECTION, SOLUTION INTRAMUSCULAR; INTRAVENOUS ONCE AS NEEDED
Status: CANCELLED | OUTPATIENT
Start: 2025-03-25

## 2025-03-25 RX ORDER — LIDOCAINE HYDROCHLORIDE 20 MG/ML
INJECTION, SOLUTION INFILTRATION; PERINEURAL AS NEEDED
Status: DISCONTINUED | OUTPATIENT
Start: 2025-03-25 | End: 2025-03-25 | Stop reason: HOSPADM

## 2025-03-25 RX ORDER — HEPARIN SODIUM 1000 [USP'U]/ML
INJECTION, SOLUTION INTRAVENOUS; SUBCUTANEOUS AS NEEDED
Status: DISCONTINUED | OUTPATIENT
Start: 2025-03-25 | End: 2025-03-25 | Stop reason: HOSPADM

## 2025-03-25 RX ORDER — ONDANSETRON HYDROCHLORIDE 2 MG/ML
INJECTION, SOLUTION INTRAVENOUS AS NEEDED
Status: DISCONTINUED | OUTPATIENT
Start: 2025-03-25 | End: 2025-03-25

## 2025-03-25 RX ORDER — FENTANYL CITRATE 50 UG/ML
INJECTION, SOLUTION INTRAMUSCULAR; INTRAVENOUS AS NEEDED
Status: DISCONTINUED | OUTPATIENT
Start: 2025-03-25 | End: 2025-03-25

## 2025-03-25 RX ORDER — MORPHINE SULFATE 2 MG/ML
2 INJECTION, SOLUTION INTRAMUSCULAR; INTRAVENOUS EVERY 5 MIN PRN
Status: CANCELLED | OUTPATIENT
Start: 2025-03-25

## 2025-03-25 RX ORDER — ACETAMINOPHEN 325 MG/1
975 TABLET ORAL ONCE
Status: CANCELLED | OUTPATIENT
Start: 2025-03-25 | End: 2025-03-25

## 2025-03-25 RX ORDER — LIDOCAINE HYDROCHLORIDE 20 MG/ML
INJECTION, SOLUTION INFILTRATION; PERINEURAL AS NEEDED
Status: DISCONTINUED | OUTPATIENT
Start: 2025-03-25 | End: 2025-03-25

## 2025-03-25 RX ORDER — HYDRALAZINE HYDROCHLORIDE 20 MG/ML
10 INJECTION INTRAMUSCULAR; INTRAVENOUS EVERY 30 MIN PRN
Status: CANCELLED | OUTPATIENT
Start: 2025-03-25

## 2025-03-25 RX ORDER — LABETALOL HYDROCHLORIDE 5 MG/ML
10 INJECTION, SOLUTION INTRAVENOUS ONCE AS NEEDED
Status: CANCELLED | OUTPATIENT
Start: 2025-03-25

## 2025-03-25 RX ORDER — MIDAZOLAM HYDROCHLORIDE 1 MG/ML
INJECTION, SOLUTION INTRAMUSCULAR; INTRAVENOUS AS NEEDED
Status: DISCONTINUED | OUTPATIENT
Start: 2025-03-25 | End: 2025-03-25

## 2025-03-25 RX ORDER — ONDANSETRON HYDROCHLORIDE 2 MG/ML
4 INJECTION, SOLUTION INTRAVENOUS ONCE AS NEEDED
Status: CANCELLED | OUTPATIENT
Start: 2025-03-25

## 2025-03-25 RX ORDER — SCOPOLAMINE 1 MG/3D
1 PATCH, EXTENDED RELEASE TRANSDERMAL ONCE
Status: CANCELLED | OUTPATIENT
Start: 2025-03-25 | End: 2025-03-25

## 2025-03-25 RX ORDER — PHENYLEPHRINE HCL IN 0.9% NACL 1 MG/10 ML
SYRINGE (ML) INTRAVENOUS AS NEEDED
Status: DISCONTINUED | OUTPATIENT
Start: 2025-03-25 | End: 2025-03-25

## 2025-03-25 RX ORDER — HYDROMORPHONE HYDROCHLORIDE 1 MG/ML
1 INJECTION, SOLUTION INTRAMUSCULAR; INTRAVENOUS; SUBCUTANEOUS EVERY 5 MIN PRN
Status: CANCELLED | OUTPATIENT
Start: 2025-03-25

## 2025-03-25 RX ORDER — NORETHINDRONE AND ETHINYL ESTRADIOL 0.5-0.035
KIT ORAL AS NEEDED
Status: DISCONTINUED | OUTPATIENT
Start: 2025-03-25 | End: 2025-03-25

## 2025-03-25 RX ORDER — SODIUM CHLORIDE 9 MG/ML
INJECTION, SOLUTION INTRAVENOUS CONTINUOUS PRN
Status: DISCONTINUED | OUTPATIENT
Start: 2025-03-25 | End: 2025-03-25 | Stop reason: HOSPADM

## 2025-03-25 RX ORDER — MIDAZOLAM HYDROCHLORIDE 1 MG/ML
1 INJECTION, SOLUTION INTRAMUSCULAR; INTRAVENOUS ONCE AS NEEDED
Status: CANCELLED | OUTPATIENT
Start: 2025-03-25

## 2025-03-25 RX ORDER — PROPOFOL 10 MG/ML
INJECTION, EMULSION INTRAVENOUS AS NEEDED
Status: DISCONTINUED | OUTPATIENT
Start: 2025-03-25 | End: 2025-03-25

## 2025-03-25 RX ORDER — METOPROLOL TARTRATE 1 MG/ML
5 INJECTION, SOLUTION INTRAVENOUS ONCE
Status: CANCELLED | OUTPATIENT
Start: 2025-03-25 | End: 2025-03-25

## 2025-03-25 RX ORDER — FAMOTIDINE 10 MG/ML
20 INJECTION, SOLUTION INTRAVENOUS ONCE
Status: CANCELLED | OUTPATIENT
Start: 2025-03-25 | End: 2025-03-25

## 2025-03-25 RX ORDER — SODIUM CHLORIDE, SODIUM LACTATE, POTASSIUM CHLORIDE, CALCIUM CHLORIDE 600; 310; 30; 20 MG/100ML; MG/100ML; MG/100ML; MG/100ML
100 INJECTION, SOLUTION INTRAVENOUS CONTINUOUS
Status: CANCELLED | OUTPATIENT
Start: 2025-03-25 | End: 2025-03-26

## 2025-03-25 RX ORDER — ALBUTEROL SULFATE 0.83 MG/ML
2.5 SOLUTION RESPIRATORY (INHALATION) ONCE AS NEEDED
Status: CANCELLED | OUTPATIENT
Start: 2025-03-25

## 2025-03-25 RX ADMIN — ONDANSETRON 4 MG: 2 INJECTION, SOLUTION INTRAMUSCULAR; INTRAVENOUS at 11:14

## 2025-03-25 RX ADMIN — Medication 150 MCG: at 08:24

## 2025-03-25 RX ADMIN — Medication 100 MCG: at 09:09

## 2025-03-25 RX ADMIN — SODIUM CHLORIDE, POTASSIUM CHLORIDE, SODIUM LACTATE AND CALCIUM CHLORIDE: 600; 310; 30; 20 INJECTION, SOLUTION INTRAVENOUS at 07:55

## 2025-03-25 RX ADMIN — PROPOFOL 150 MG: 10 INJECTION, EMULSION INTRAVENOUS at 08:02

## 2025-03-25 RX ADMIN — Medication 150 MCG: at 09:16

## 2025-03-25 RX ADMIN — FENTANYL CITRATE 50 MCG: 50 INJECTION, SOLUTION INTRAMUSCULAR; INTRAVENOUS at 08:02

## 2025-03-25 RX ADMIN — Medication 100 MCG: at 08:30

## 2025-03-25 RX ADMIN — Medication 50 MCG: at 10:51

## 2025-03-25 RX ADMIN — LIDOCAINE HYDROCHLORIDE 40 MG: 20 INJECTION, SOLUTION INFILTRATION; PERINEURAL at 08:02

## 2025-03-25 RX ADMIN — Medication 50 MCG: at 09:42

## 2025-03-25 RX ADMIN — Medication 100 MCG: at 09:51

## 2025-03-25 RX ADMIN — EPHEDRINE SULFATE 5 MG: 50 INJECTION, SOLUTION INTRAVENOUS at 08:41

## 2025-03-25 RX ADMIN — MIDAZOLAM 2 MG: 1 INJECTION INTRAMUSCULAR; INTRAVENOUS at 07:59

## 2025-03-25 RX ADMIN — Medication 100 MCG: at 08:41

## 2025-03-25 RX ADMIN — Medication 50 MCG: at 09:37

## 2025-03-25 RX ADMIN — Medication 100 MCG: at 08:21

## 2025-03-25 RX ADMIN — Medication 100 MCG: at 10:21

## 2025-03-25 RX ADMIN — Medication 50 MCG: at 10:00

## 2025-03-25 RX ADMIN — Medication 100 MCG: at 08:57

## 2025-03-25 RX ADMIN — Medication 100 MCG: at 10:09

## 2025-03-25 RX ADMIN — Medication 100 MCG: at 08:08

## 2025-03-25 RX ADMIN — Medication 100 MCG: at 10:06

## 2025-03-25 RX ADMIN — Medication 100 MCG: at 11:00

## 2025-03-25 RX ADMIN — Medication 150 MCG: at 08:18

## 2025-03-25 RX ADMIN — Medication 100 MCG: at 08:33

## 2025-03-25 SDOH — HEALTH STABILITY: MENTAL HEALTH: CURRENT SMOKER: 0

## 2025-03-25 ASSESSMENT — PAIN SCALES - GENERAL
PAINLEVEL_OUTOF10: 0 - NO PAIN
PAIN_LEVEL: 0
PAINLEVEL_OUTOF10: 0 - NO PAIN

## 2025-03-25 ASSESSMENT — COLUMBIA-SUICIDE SEVERITY RATING SCALE - C-SSRS
1. IN THE PAST MONTH, HAVE YOU WISHED YOU WERE DEAD OR WISHED YOU COULD GO TO SLEEP AND NOT WAKE UP?: NO
6. HAVE YOU EVER DONE ANYTHING, STARTED TO DO ANYTHING, OR PREPARED TO DO ANYTHING TO END YOUR LIFE?: NO
2. HAVE YOU ACTUALLY HAD ANY THOUGHTS OF KILLING YOURSELF?: NO

## 2025-03-25 NOTE — POST-PROCEDURE NOTE
Physician Transition of Care Summary  Invasive Cardiovascular Lab    Procedure Date: 3/25/2025  Attending:    * James C Ramicone - Primary  Resident/Fellow/Other Assistant: Surgeons and Role:  * No surgeons found with a matching role *    Indications:   Pre-op Diagnosis      * PSVT (paroxysmal supraventricular tachycardia) (CMS-HCC) [I47.10]    Post-procedure diagnosis:   Post-op Diagnosis     * PSVT (paroxysmal supraventricular tachycardia) (CMS-HCC) [I47.10]    Procedure(s):   Ablation SVT CPT 12625  69315 - NM COMPRE EP EVAL ABLTJ 3D MAPG TX SVT        Procedure Findings:       Description of the Procedure:   Successful catheter ablation for typical AVNRT    Complications:   None    Stents/Implants:   Implants       No implant documentation for this case.            Anticoagulation/Antiplatelet Plan:   Resume Eliquis tonight    Estimated Blood Loss:   10 mL    Anesthesia: General Anesthesia Staff: Anesthesiologist: Abdulkadir Fiore MD  C-AA: ANTOINETTE Phipps  SRNA: Olya Carpio    Any Specimen(s) Removed:   Order Name Source Comment Collection Info Order Time   BASIC METABOLIC PANEL Blood, Venous  Collected By: Aurora Gomez RN 3/25/2025  6:33 AM     Release result to [facility]   Immediate        CBC Blood, Venous  Collected By: Aurora Gomez RN 3/25/2025  6:33 AM     Release result to [facility]   Immediate        PROTIME-INR Blood, Venous  Collected By: Aurora Gomez RN 3/25/2025  6:33 AM     Release result to [facility]   Immediate        TYPE AND SCREEN Blood, Venous  Collected By: Aurora Gomez RN 3/25/2025  6:33 AM     Release result to MyChart   Immediate        VERAB/VERIFY ABORH Blood, Venous **This is for confirming/verifying history of ABORh on file for transfusion of blood products. If this is not for transfusion, please order an ABO/RH [[id number]]. If you have any questions or unsure what to order, please call the blood bank.** Collected By: Aurora Gomez RN 3/25/2025  6:33 AM     Release result to  MyChart   Immediate            Disposition:   Planning for same-day discharge      Electronically signed by: James C Ramicone, DO, 3/25/2025 12:05 PM

## 2025-03-25 NOTE — Clinical Note
Accessed site: left femoral vein.   Ultrasound guidance was used. 4 Monegasque micro needle advanced followed by 4 Monegasque sheath, sheath removed and 0.035 J wire advanced

## 2025-03-25 NOTE — Clinical Note
Accessed site: right femoral vein.   Ultrasound guidance was used. 4 Equ[place]an micro needle advanced followed by 4 [place] sheath, sheath removed and 0.035 J wire advanced. Third access

## 2025-03-25 NOTE — ANESTHESIA POSTPROCEDURE EVALUATION
Patient: Emmie Krishna    Procedure Summary       Date: 03/25/25 Room / Location: [facility] EP LAB / Virtual [facility] Cardiac Cath Lab    Anesthesia Start: 0751 Anesthesia Stop: 1145    Procedure: Ablation SVT CPT 76860 (Groin) Diagnosis: PSVT (paroxysmal supraventricular tachycardia) (CMS-[facility])    Providers: James C Ramicone, DO Responsible Provider: Abdulkadir Fiore MD    Anesthesia Type: general ASA Status: 2            Anesthesia Type: general    Vitals Value Taken Time   /79 03/25/25 1144   Temp 36 °C (96.8 °F) 03/25/25 1144   Pulse 80 03/25/25 1144   Resp 16 03/25/25 1144   SpO2 100 % 03/25/25 1144       Anesthesia Post Evaluation    Patient location during evaluation: PACU  Patient participation: complete - patient participated  Level of consciousness: awake  Pain score: 0  Pain management: adequate  Airway patency: patent  Cardiovascular status: acceptable  Respiratory status: acceptable  Hydration status: acceptable  Postoperative Nausea and Vomiting: none        There were no known notable events for this encounter.

## 2025-03-25 NOTE — Clinical Note
Sheath was removed in the left femoral vein. Ultrasound guidance was used. Site closed by manual compression.

## 2025-03-25 NOTE — Clinical Note
Accessed site: vein.   Ultrasound guidance was used. 4 Latvian micro needle advanced followed by 4 Latvian sheath, sheath removed and 0.035 J wire advanced

## 2025-03-25 NOTE — DISCHARGE INSTRUCTIONS
FOR SUDDEN AND SEVERE CHEST PAIN, SHORTNESS OF BREATH, EXCESSIVE BLEEDING, SIGNS OF STROKE, OR CHANGES IN MENTAL STATUS YOU SHOULD CALL 911 IMMEDIATELY.\    1.) GENERAL ANESTHESIA OR SEDATION  -The anesthetics, sedatives or pain medicine which you were given, will be acting in your body for approximately 24 hours and you may feel a little sleepy. This feeling will slowly wear off over the next 24 hours therefore:   A. DO NOT drive a car, operate machinery or power tools.   B. DO NOT drink any alcoholic drinks or take any non-prescriptive medications that contain alcohol.   C. DO NOT make any important decisions or sign any legal documents.    2.) ACTIVITY   A. You are advised to go directly home from the hospital.   B. DO NOT lift anything heavier than 10 pounds for 1 week, to allow time for your groin to properly heal. No vigorous or strenuous physical activity for 1 week.   C. Keep stair climbing to a minimum the first day after your procedure.   D. No sexual activities for 24 hours after your procedure.     3.) DIET  -You may resume your normal diet. However, it is better to start with liquids and gradually work up to solid foods.    4.) GROIN CARE   A. We will apply an adhesive bandage to your groin before you go home. Keep this bandage clean and dry for 24 hours.   B. You may remove the bandage in 24 hours. You may then shower, gently washing the site with soap and water, only and patting dry.   C. DO NOT go swimming, soak in a bathtub or hot tub for 1 week to help prevent infection. Do not use lotions, ointments or powders on your groin for one week. You can cover the puncture site with a Band-Aid as needed.  D. Your groin should remain soft, clean and dry. It is normal to experience tenderness and bruising at the site. Also, a pea sized lump may be present. That should go away on its own within 1 week.  E. Watch for signs and symptoms of infection: fever (greater than 101F), redness, drainage, increasing  tenderness. Also, watch for bruising that spreads down your leg or a lump at the puncture site that grows or is larger than a pea.    5.) SPECIFIC CONCERNS TO WATCH FOR:  A. If you start bleeding from your groin, lay down and have someone apply firm pressure just above the puncture site. If bleeding does not stop after 5 minutes of firm pressure or if you  cannot control the bleeding, CALL 911.  B. If after you are discharged, you develop: difficulty breathing, rash, hives, severe nausea, vomiting, lightheadedness or any signs and symptoms of an infection, please call your doctor and go to the nearest emergency room.    6.) SAFETY  -It is recommended that a responsible adult be with you for the first 24 hours. This is for your protection and safety since you may not be as alert as usual.    7.) FOLLOW-UP   A. Call your doctor as ordered to schedule a follow-up appointment, usually within one week.        OTHER INSTRUCTIONS-  If you do not have a follow up appointment with your cardiologist in the next 2-4 weeks please call to make an appointment.  Activity instructions  DO NOT drink any alcholic beverages or take any non-prescriptive medications that contain alcohol for the first 24 hours  DO NOT drive a car, operate machinery or power tools. It is recommended that a responsible adult be with you for the first 24 hours.  DO NOT make important decisions for the first 24 hours

## 2025-03-25 NOTE — NURSING NOTE
Identified patient 2 identifiers verified. Patient is requesting a letter to relieve her of jury duty due to her cancer treatments and she is unable to due jury duty at this time. Patient needs this letter by Feb 25th. Patient received discharge instructions, they were discussed, questions answered and given to the patient; patient ambulated with no complaints of lightheadedness or dizziness; vital signs stable and hemostasis obtained. IV removed; no bleeding and no hematoma at sites, patient left unit with belongings and discharge instructions, with nurse in wheelchair

## 2025-03-25 NOTE — ANESTHESIA PROCEDURE NOTES
Airway  Date/Time: 3/25/2025 8:03 AM  Urgency: elective    Airway not difficult    Staffing  Performed: BLOSSOM   Authorized by: Abdulkadir Fiore MD    Performed by: ANTOINETTE Phipps  Patient location during procedure: OR    Indications and Patient Condition  Indications for airway management: anesthesia  Spontaneous Ventilation: absent  Sedation level: deep  Preoxygenated: yes  Patient position: sniffing  MILS maintained throughout  Mask difficulty assessment: 0 - not attempted  Planned trial extubation    Final Airway Details  Final airway type: supraglottic airway      Successful airway: Supraglottic airway: I Gel.  Size 3     Number of attempts at approach: 1  Number of other approaches attempted: 0    Additional Comments  Easy LMA insertion. Lips and teeth in preanesthetic condition.

## 2025-03-25 NOTE — TELEPHONE ENCOUNTER
----- Message from Na Arriaga sent at 3/25/2025  8:25 AM EDT -----  Please call Emmie and tell her her colon cancer screening test is negative  She can repeat in 3 years  Call if need anything

## 2025-03-25 NOTE — Clinical Note
Accessed site: right femoral vein.   Ultrasound guidance was used. 4 Iranian micro needle advanced followed by 4 Iranian sheath, sheath removed and 0.035 J wire advanced. Fourth access

## 2025-03-25 NOTE — ANESTHESIA PREPROCEDURE EVALUATION
Patient: Emmie Krishna    Procedure Information       Date/Time: 03/25/25 0800    Procedure: Ablation SVT CPT 37940 - 8:00 am, CPT 67595, general anesthesia    Location: [facility] / [facility]    Providers: James C Ramicone, DO            Relevant Problems   Anesthesia   (-) Difficult intubation   (-) PONV (postoperative nausea and vomiting)      Cardiac   (+) PSVT (paroxysmal supraventricular tachycardia) (CMS-HCC)      Hematology   (+) Acute deep vein thrombosis (DVT) of popliteal vein of right lower extremity (Multi)       Clinical information reviewed:   Tobacco  Allergies  Meds   Med Hx  Surg Hx   Fam Hx  Soc Hx        NPO Detail:  NPO/Void Status  Date of Last Liquid: 03/24/25  Time of Last Liquid: 2300  Date of Last Solid: 03/24/25  Time of Last Solid: 2000  Last Intake Type: Clear fluids         Physical Exam    Airway  Mallampati: II  TM distance: >3 FB  Neck ROM: full     Cardiovascular   Rhythm: regular  Rate: normal  Comments: NSR today   Dental - normal exam     Pulmonary - normal exam     Abdominal - normal exam             Anesthesia Plan    History of general anesthesia?: yes  History of complications of general anesthesia?: no    ASA 2     general   (LMA ok)  The patient is not a current smoker.  Education provided regarding risk of obstructive sleep apnea.  intravenous induction   Postoperative administration of opioids is intended.  Trial extubation is planned.  Anesthetic plan and risks discussed with patient.  Use of blood products discussed with patient who consented to blood products.    Plan discussed with attending.

## 2025-03-25 NOTE — Clinical Note
Accessed site: left femoral vein.   Ultrasound guidance was used. 4 Burkinan micro needle advanced followed by 4 Burkinan sheath, sheath removed and 0.035 J wire advanced. Second access

## 2025-03-30 LAB
ATRIAL RATE: 90 BPM
P AXIS: 67 DEGREES
P OFFSET: 202 MS
P ONSET: 154 MS
PR INTERVAL: 150 MS
Q ONSET: 229 MS
QRS COUNT: 14 BEATS
QRS DURATION: 70 MS
QT INTERVAL: 368 MS
QTC CALCULATION(BAZETT): 450 MS
QTC FREDERICIA: 421 MS
R AXIS: 70 DEGREES
T AXIS: 78 DEGREES
T OFFSET: 413 MS
VENTRICULAR RATE: 90 BPM

## 2025-05-20 ENCOUNTER — TELEPHONE (OUTPATIENT)
Dept: CARDIOLOGY | Facility: CLINIC | Age: 70
End: 2025-05-20
Payer: COMMERCIAL

## 2025-05-20 NOTE — TELEPHONE ENCOUNTER
Message sent to Dr Ramicone- ok to fly. Pt notified. Discussed with pt that it's a good idea to get up and move around periodically and continue eliquis.        before meals and at bedtime

## 2025-05-20 NOTE — TELEPHONE ENCOUNTER
Patient thought her appt.  Was today. It was yesterday and she missed it. She is r/s to 6/27. She needs to know if it is ok to fly? She is going out of town Thursday. She says she feels fine.

## 2025-06-27 ENCOUNTER — OFFICE VISIT (OUTPATIENT)
Dept: CARDIOLOGY | Facility: CLINIC | Age: 70
End: 2025-06-27
Payer: COMMERCIAL

## 2025-06-27 VITALS
SYSTOLIC BLOOD PRESSURE: 122 MMHG | HEART RATE: 89 BPM | BODY MASS INDEX: 26.82 KG/M2 | OXYGEN SATURATION: 96 % | DIASTOLIC BLOOD PRESSURE: 80 MMHG | WEIGHT: 161 LBS | HEIGHT: 65 IN

## 2025-06-27 DIAGNOSIS — I82.431 ACUTE DEEP VEIN THROMBOSIS (DVT) OF POPLITEAL VEIN OF RIGHT LOWER EXTREMITY: ICD-10-CM

## 2025-06-27 DIAGNOSIS — I47.10 PSVT (PAROXYSMAL SUPRAVENTRICULAR TACHYCARDIA): Primary | ICD-10-CM

## 2025-06-27 PROCEDURE — 1159F MED LIST DOCD IN RCRD: CPT | Performed by: INTERNAL MEDICINE

## 2025-06-27 PROCEDURE — 99212 OFFICE O/P EST SF 10 MIN: CPT

## 2025-06-27 PROCEDURE — 3008F BODY MASS INDEX DOCD: CPT | Performed by: INTERNAL MEDICINE

## 2025-06-27 PROCEDURE — 99213 OFFICE O/P EST LOW 20 MIN: CPT | Performed by: INTERNAL MEDICINE

## 2025-06-27 PROCEDURE — 1036F TOBACCO NON-USER: CPT | Performed by: INTERNAL MEDICINE

## 2025-06-27 PROCEDURE — 93005 ELECTROCARDIOGRAM TRACING: CPT | Performed by: INTERNAL MEDICINE

## 2025-06-27 RX ORDER — APIXABAN 5 MG/1
5 TABLET, FILM COATED ORAL
Qty: 60 TABLET | Refills: 11 | Status: SHIPPED | OUTPATIENT
Start: 2025-06-27 | End: 2026-06-27

## 2025-06-27 NOTE — PROGRESS NOTES
History Of Present Illness:      This is a 70-year-old female with a history of supraventricular tachycardia.  She presents for a follow-up evaluation after undergoing catheter ablation for AVNRT on March 25, 2025.  She has had no recurrences of SVT.      Emmie was initially seen in consultation on March 5, 2025.  The patient first began experiencing episodes of supraventricular tachycardia in the summer 2024.  1 episode occurred while she was playing pickle ball.  She sat down to take a rest and the arrhythmia stopped spontaneously.  She had a few other episodes that would occur randomly and would also terminate spontaneously.  More recently she had episodes on January 6, 2025 and then an episode on February 12, 2025 which required intravenous adenosine for termination.  The patient was seen at [facility].  She has been been taking metoprolol succinate 25 mg daily.     Past medical history:     Popliteal DVT of the right lower extremity: This occurred following a long plane flight to [place].  She has been on Eliquis 5 mg twice daily since January 16, 2025.     Past surgical history: Foot surgery     Social history: Non-smoker     Family history: Negative for premature CAD    Review of Systems  Other review of systems negative  Last Recorded Vitals:      3/25/2025    11:45 AM 3/25/2025    11:50 AM 3/25/2025    11:55 AM 3/25/2025    12:00 PM 3/25/2025    12:15 PM 3/25/2025    12:30 PM 3/25/2025    12:45 PM   Vitals   Systolic 137   126 134 124 127   Diastolic 74   62 66 63 66   Heart Rate 81 79 80 82 82 80 80   Temp       36.1 °C (97 °F)   Resp 16   16 15 14 15     Allergies:  Prednisone and Thimerosal  Outpatient Medications:  Current Outpatient Medications   Medication Instructions    Eliquis 5 mg tablet 1 tablet, Every 12 hours scheduled (0630,1830)       Physical Exam:    General Appearance:  Alert, oriented, no distress  Skin:  Warm and dry  Head and Neck:  No elevation of JVP, no  "carotid bruits  Cardiac Exam:  Rhythm is regular, S1 and S2 are normal, no murmur S3 or S4  Lungs:  Clear to auscultation  Extremities:  no edema  Neurologic:  No focal deficits  Psychiatric:  Appropriate mood and behavior    Lab Results:    CMP:  Recent Labs     03/25/25  0650      K 3.8      CO2 27   ANIONGAP 12   BUN 17   CREATININE 0.90   EGFR 69   No results for input(s): \"ALBUMIN\", \"ALKPHOS\", \"ALT\", \"AST\", \"BILITOT\", \"LIPASE\" in the last 93190 hours.    No lab exists for component: \"CA\"  CBC:  Recent Labs     03/25/25  0650   WBC 5.2   HGB 14.3   HCT 43.9      MCV 91     COAG:   Recent Labs     03/25/25  0650   INR 1.0     Cardiology Tests (personally reviewed):    EKG March 5, 2025: Sinus rhythm, no ventricular preexcitation     SVT is a regular narrow QRS complex tachycardia, 175 bpm    Assessment/Plan   Problem List Items Addressed This Visit           ICD-10-CM    Acute deep vein thrombosis (DVT) of popliteal vein of right lower extremity I82.431    Relevant Medications    Eliquis 5 mg tablet    Other Relevant Orders    Referral to Vascular Medicine    PSVT (paroxysmal supraventricular tachycardia) - Primary I47.10    1.  PSVT: Emmie is doing well following catheter ablation of AVNRT on March 25, 2025.  She has been maintaining sinus rhythm with no SVT recurrences.  EP follow-up in 1 year.    2.  DVT: This patient has a history of a right popliteal DVT and has been on Eliquis.  A vascular medicine consultation with Dr. Garcia is recommended to determine length of therapy of the Eliquis.  The patient has an upcoming travel planned and I have advised her to stay on Eliquis at this time.         Relevant Orders    ECG 12 lead (Clinic Performed) (Completed)       James C Ramicone,     "

## 2025-06-27 NOTE — ASSESSMENT & PLAN NOTE
1.  PSVT: Emmie is doing well following catheter ablation of AVNRT on March 25, 2025.  She has been maintaining sinus rhythm with no SVT recurrences.  EP follow-up in 1 year.    2.  DVT: This patient has a history of a right popliteal DVT and has been on Eliquis.  A vascular medicine consultation with Dr. Garcia is recommended to determine length of therapy of the Eliquis.  The patient has an upcoming travel planned and I have advised her to stay on Eliquis at this time.

## 2025-06-28 LAB
ATRIAL RATE: 78 BPM
P AXIS: 60 DEGREES
P OFFSET: 196 MS
P ONSET: 155 MS
PR INTERVAL: 146 MS
Q ONSET: 228 MS
QRS COUNT: 13 BEATS
QRS DURATION: 68 MS
QT INTERVAL: 374 MS
QTC CALCULATION(BAZETT): 426 MS
QTC FREDERICIA: 408 MS
R AXIS: 82 DEGREES
T AXIS: 68 DEGREES
T OFFSET: 415 MS
VENTRICULAR RATE: 78 BPM

## 2025-08-05 DIAGNOSIS — Z12.31 ENCOUNTER FOR SCREENING MAMMOGRAM FOR BREAST CANCER: ICD-10-CM

## (undated) DEVICE — CATHETER, EP SUPREME, 5-5-5, CRD-2, 6FR X 120CM

## (undated) DEVICE — SHEATH, PINNACLE, W/.038 GUIDEWIRE, 10 CM,  6FR INTRODUCER, 6FR DIA, 2.5 CM DIALATOR

## (undated) DEVICE — ELECTRODE KIT, ENSITE X EP SYSTEM SURFACE

## (undated) DEVICE — 10IN (25CM) EXCITE FLEXIBLE, CLEAR NYLON/POLYURETHANE CO-EXTRUDED CONTRAST INJECTION TUBING, FIXED MALE CONNECTOR

## (undated) DEVICE — CLOSURE SYSTEM, VASCULAR, MVP 6-12FR, VENOUS

## (undated) DEVICE — CATHETER, ELECTRODE, STEERABLE, EP-XT, LG CURVE, 6FX125CM, REPROCESSED

## (undated) DEVICE — ELECTRODE, MULTIFUNCTION, QUICK-COMBO, EDGE SYSTEM, 2 FT

## (undated) DEVICE — CABLE, SUPREME 6 PIN ENSITE X

## (undated) DEVICE — PAD, GROUNDING, ELECTROSURGICAL, W/9 FT CABLE, POLYHESIVE II, ADULT, LF

## (undated) DEVICE — ACCESS KIT, MINI MAK, 4FR X 10CML, 0.018 X 40CM, SS/SS, ECHO ENHANCED 7CM NDL

## (undated) DEVICE — CATHETER, SUPREME EP, HEXAPOLAR, UNIPOLAR SPACING, 5-5-175-175MM JSN CURVE

## (undated) DEVICE — INTRODUCER, SHEATH, FAST-CATH, 8.5FR X 12CM, C-LOCK

## (undated) DEVICE — CATHETER, TACTIFLEX, BI-D ABLATION, 8FR 2-2-2MM D-F CURVE

## (undated) DEVICE — INTRODUCER, HEMO, FAST-CATH, 8.5 FR X 63 CM, SR0 038GW, G2